# Patient Record
(demographics unavailable — no encounter records)

---

## 2024-10-30 NOTE — DISCUSSION/SUMMARY
[FreeTextEntry1] : Ms. Ahumada is a 24-year-old female with history of PCOS who presents today for initial evaluation of headaches. No focal neurological deficits on exam. She meets criteria for migraine headaches. Given increase in severity/frequency over time, will obtain MRI brain wo contrast to evaluate for any secondary causes. We discussed treatment options of abortive and preventative therapy. She is already taking Magnesium daily, but I also recommend she start taking Riboflavin 400 mg daily for preventative therapy. Will trial rizatriptan 10 mg as needed for migraine relief. She was educated on directions for use and safety precautions of the medication. I addressed the importance of limiting abortive treatments to no more than 3x/week to prevent medication overuse headaches. I would like her to keep a headache journal to try and identify triggers and track headache frequency and response to treatment. Pt referral provided. Plan to follow up in 1 month.  All of the patient's questions and concerns were addressed.

## 2024-10-30 NOTE — HISTORY OF PRESENT ILLNESS
[FreeTextEntry1] : Ms. Ahumada is a 24-year-old female with history of PCOS who presents today for initial evaluation of headaches.   HAs Began: 8 years old, progressively increasing in severity and frequency as she has gotten older...never saw neurology Quality: pulsating Temporal course: switches between right and left side, more right sided, always unilateral Severity: can reach a 10/10 Treatment: Tylenol, Advil, Excedrin- used to help in the past but not now Frequency: less than 15 days a month, unsure exact amount  Duration: all day Triggers: No noticeable triggers   Disability: interferes with work, she is an HHA Time of day: varies Relieving factors: Going in a dark room helps   Aura:  sometimes sees silver glitter before a headache, can last about 3 minutes    Associated with menses: No   Ass'd Symptoms: Nausea + Vomiting - Photophobia + Phonophobia + Blurred Vision + Neck pain +, noticeable recently Vertigo +, not frequently Brain Fog +   Treatment present: Acute: Tylenol, Advil, Excedrin Preventive: None   Imaging: last MRI was about 10 years ago   Diet: 2 meals a day, drinks a lot of water Sleep: 8 hours a night Dental: No jaw pain, grinding her teeth Head Trauma: No   FH: None that she knows

## 2024-11-25 NOTE — HISTORY OF PRESENT ILLNESS
[FreeTextEntry1] : Ms. Ahumada is a 24-year-old female with history of PCOS who presents today for follow up evaluation of migraine headaches. MRI brain wo 11/11/24 unremarkable. She reports one headache in the last month. She took rizatriptan and took an hour nap. When she woke up the headache was gone. Tolerated it well. She has been taking Mg and Riboflavin daily. She started PT and has done 4 sessions so far. No new concerns.

## 2024-11-25 NOTE — DISCUSSION/SUMMARY
[FreeTextEntry1] : Ms. Ahumada is a 24-year-old female with history of PCOS who presents today for follow up evaluation of migraine headaches. No focal neurological deficits on exam. MRI brain wo 11/11/24 unremarkable. Will continue Magnesium and Riboflavin 400 mg daily for preventative therapy and rizatriptan 10 mg as needed for migraine relief. Will plan to continue PT as well. Follow up in 6 months or sooner if needed. She will notify me if she experiences any new or unusual headaches. All of the patient's questions and concerns were addressed.

## 2024-11-25 NOTE — REVIEW OF SYSTEMS
Left message for pt to sched f/u TPCT and OV on the same day at pt req.     [As Noted in HPI] : as noted in HPI